# Patient Record
Sex: MALE | URBAN - METROPOLITAN AREA
[De-identification: names, ages, dates, MRNs, and addresses within clinical notes are randomized per-mention and may not be internally consistent; named-entity substitution may affect disease eponyms.]

---

## 2019-05-20 ENCOUNTER — HISTORICAL (OUTPATIENT)
Dept: ADMINISTRATIVE | Facility: HOSPITAL | Age: 69
End: 2019-05-20

## 2019-11-11 ENCOUNTER — HISTORICAL (OUTPATIENT)
Dept: ADMINISTRATIVE | Facility: HOSPITAL | Age: 69
End: 2019-11-11

## 2019-12-09 ENCOUNTER — HISTORICAL (OUTPATIENT)
Dept: ADMINISTRATIVE | Facility: HOSPITAL | Age: 69
End: 2019-12-09

## 2020-01-10 ENCOUNTER — HISTORICAL (OUTPATIENT)
Dept: RADIOLOGY | Facility: HOSPITAL | Age: 70
End: 2020-01-10

## 2020-02-13 ENCOUNTER — HISTORICAL (OUTPATIENT)
Dept: ADMINISTRATIVE | Facility: HOSPITAL | Age: 70
End: 2020-02-13

## 2020-08-13 ENCOUNTER — HISTORICAL (OUTPATIENT)
Dept: ADMINISTRATIVE | Facility: HOSPITAL | Age: 70
End: 2020-08-13

## 2022-04-12 ENCOUNTER — HISTORICAL (OUTPATIENT)
Dept: ADMINISTRATIVE | Facility: HOSPITAL | Age: 72
End: 2022-04-12

## 2022-04-30 VITALS
SYSTOLIC BLOOD PRESSURE: 156 MMHG | WEIGHT: 235.88 LBS | DIASTOLIC BLOOD PRESSURE: 88 MMHG | OXYGEN SATURATION: 96 % | HEIGHT: 67 IN | BODY MASS INDEX: 37.02 KG/M2

## 2022-05-05 NOTE — HISTORICAL [facility] C[facility]ER
This is a historical note converted from Maria Antonia. Formatting and pictures may have been removed.  Please reference Maria Antonia for original formatting and attached multimedia. Chief Complaint  right knee pain,injury happened Saturday--he was taking off his shoe and he felt a pop, (knee has been bothering him for a few months).  History of Present Illness  Patient comes in today complaint of right knee pain. ?Patient was?taking off his shoe when he twisted his knee and felt a pop. ?Since then he is been having some pain and swelling in his knee.? He states it is getting a little bit better?though has not returned to normal.  Physical Exam  Vitals & Measurements  T:?36.9? ?C (Oral)? HR:?65(Peripheral)? BP:?153/83?  HT:?170?cm? WT:?107?kg? BMI:?37.02?  Right lower extremity compartments are soft and warm. ?Skin is intact with no signs or symptoms of DVT or infection. ?He is tender along medial lateral joint line there is a small effusion?positive patella grind negative apprehension?he is tender mainly laterally,?questionable positive lateral medial Mc[place]. ?His motion is 5 to 150 degrees he stable to stressing he is neurovascular intact distally. ?X-rays?3 views right knee demonstrates tricompartmental osteoarthritis  Assessment/Plan  1.?Acute lateral meniscus tear of right knee?S83.281A  ?At this time we discussed his physical exam and x-ray findings. ?We have discussed his degenerative lateral meniscus tear versus?of his?existing arthritis. ?Under sterile technique he tolerated a steroid injection very well for the anterolateral part of the right knee. ?This was 2 cc of dexamethasone 3 cc of 2%?lidocaine without.? We have discussed low impact activities patient states he will call?if remains to be symptomatic.  Ordered:  Office/Outpatient Visit Level 3 Established [id number] PC, Acute lateral meniscus tear of right knee  Effusion, right knee  [facility], 11/11/19 11:00:00  CST  ?  2.?Effusion, right knee?M25.461  Ordered:  Office/Outpatient Visit Level 3 Established [id number] PC, Acute lateral meniscus tear of right knee  Effusion, right knee  Osteoarthritis of right knee, [facility], 11/11/19 11:00:00 CST  ?  3.?Osteoarthritis of right knee?M17.11  Ordered:  Office/Outpatient Visit Level 3 Established [id number] PC, Acute lateral meniscus tear of right knee  Effusion, right knee  Osteoarthritis of right knee, [facility], 11/11/19 11:00:00 CST  ?  Orders:  1125F Pain severity quantified, pain present, 11/11/19 11:00:00 CST  3008F Body Mass Index (BMI), documented, 11/11/19 11:00:00 CST  3077F Most recent systolic blood pressure, greater than or equal to 140 mm Hg, 11/11/19 11:00:00 CST  3079F Most recent diastolic blood pressure, 80 - 89 mm Hg, 11/11/19 11:00:00 CST  asp/inj jnt/bursa, major [id number] PC, 11/11/19 11:00:00 CST, [facility], Routine, 11/11/19 11:00:00 CST  Clinic Follow-up PRN, 11/11/19 11:00:00 CST, Future Order, [facility]  Referrals  Clinic Follow-up PRN, 11/11/19 11:00:00 CST, Future Order, [facility]   Problem List/Past Medical History  Ongoing  Obesity  Historical  High blood pressure  Medications  amLODIPine 10 mg oral tablet, 10 mg= 1 tab(s), Oral, Daily  Mobic 15 mg oral tablet, 15 mg= 1 tab(s), Oral, Daily  Mobic 15 mg oral tablet, 15 mg= 1 tab(s), Oral, Daily, 3 refills  Pantoprazole 40 mg ORAL EC-Tablet, 40 mg= 1 tab(s), Oral, Daily  sildenafil 20 mg oral tablet, 20 mg= 1 tab(s), Oral, TID  Allergies  No Known Medication Allergies  Social History  Abuse/Neglect  No, No, Yes, 11/11/2019  Alcohol  Current, 05/20/2019  Tobacco  Never (less than 100 in lifetime), N/A, 11/11/2019  Family History  Dementia: Mother.  Diabetes mellitus type 2: Father.  Health Maintenance  Health Maintenance  ???Pending?(in the next year)  ??? ??OverDue  ??? ? ? ?Advance Directive due??01/01/19??and every 1??year(s)  ??? ? ? ?Cognitive Screening  due??01/01/19??and every 1??year(s)  ??? ??Due?  ??? ? ? ?ADL Screening due??11/12/19??and every 1??year(s)  ??? ? ? ?Aspirin Therapy for CVD Prevention due??11/12/19??and every 1??year(s)  ??? ? ? ?Colorectal Screening (Senior Wellness) due??11/12/19??and every?  ??? ? ? ?Hypertension Management-BMP due??11/12/19??and every?  ??? ? ? ?Pneumococcal Vaccine due??11/12/19??Variable frequency  ??? ? ? ?Pneumococcal Vaccine due??11/12/19??and every?  ??? ? ? ?Tetanus Vaccine due??11/12/19??and every 10??year(s)  ??? ? ? ?Zoster Vaccine due??11/12/19??and every 100??year(s)  ??? ??Due In Future?  ??? ? ? ?Alcohol Misuse Screening not due until??01/01/20??and every 1??year(s)  ??? ? ? ?Fall Risk Assessment not due until??01/01/20??and every 1??year(s)  ??? ? ? ?Functional Assessment not due until??01/01/20??and every 1??year(s)  ??? ? ? ?Geriatric Depression Screening not due until??01/01/20??and every 1??year(s)  ??? ? ? ?Obesity Screening not due until??01/01/20??and every 1??year(s)  ??? ? ? ?Hypertension Management-Blood Pressure not due until??11/10/20??and every 1??year(s)  ???Satisfied?(in the past 1 year)  ??? ??Satisfied?  ??? ? ? ?Alcohol Misuse Screening on??09/23/19.??Satisfied by Esperanza Weems LPN  ??? ? ? ?Blood Pressure Screening on??11/11/19.??Satisfied by Esperanza Weems LPN  ??? ? ? ?Body Mass Index Check on??11/11/19.??Satisfied by Esperanza Weems LPN  ??? ? ? ?Depression Screening on??06/24/19.??Satisfied by Salena Presley  ??? ? ? ?Fall Risk Assessment on??05/20/19.??Satisfied by Ila Alexander  ??? ? ? ?Functional Assessment on??05/20/19.??Satisfied by Ila Alexander  ??? ? ? ?Geriatric Depression Screening on??06/24/19.??Satisfied by Salena Presley  ??? ? ? ?Hypertension Management-Blood Pressure on??11/11/19.??Satisfied by Esperanza Weems LPN  ??? ? ? ?Obesity Screening on??11/11/19.??Satisfied by Esperanza Weems LPN  ?

## 2022-05-05 NOTE — HISTORICAL [facility]ER
This is a historical note converted from Maria Antonia. Formatting and pictures may have been removed.  Please reference Maria Antonia for original formatting and attached multimedia. Chief Complaint  lower back pain, radiates from lowerback down right leg with slight numbness started around 7.13.2020.  History of Present Illness  Patient comes in today complaining of lower back pain, shooting pain down his right leg. ?He states it is going on for the last month. ?He was initially seen by his PCP?normally goes away by now, though still present. ?He denies any bowel or bladder dysfunction he denies any specific trauma he denies other complaints.  Physical Exam  Vitals & Measurements  T:?99.7? ?F (Oral)? HR:?76(Peripheral)? BP:?156/86?  HT:?170.00?cm? WT:?37.020?kg? BMI:?12.81?  Semination lumbar spine he is tender patient with the paraspinal muscles more on the right side. ?He is nontender over the spinous processes. ?He is able to forward flex?50 degrees.? He has 5-5 strength from L2-S1 states intact light touch negative clonus bilaterally.? X-rays 3 views lumbar spine demonstrates L4-5 L5?1?degeneration  Assessment/Plan  1.?Lumbar radiculopathy?M54.16  ?At this time we discussed his physical exam and x-ray findings. ?Patient has lumbar radiculopathy,?we will proceed with an MRI of his lumbar spine. ?We have discussed?following up with Dr. Abdi, who has been addressing his cervical spine, he would like to proceed with this,?in the meantime I will call him with his MRI results. ?We have discussed a Medrol Dosepak with appropriate precautions,?lumbar stabilization and strengthening exercises.  Ordered:  MRI Lumbar Spine W/O Contrast, Routine, 08/13/20 10:51:00 CDT, Other (please specify), Back pain or radiculopathy, > 6 wks, None, Ambulatory, Rad Type, Order for future visit, Lumbar radiculopathy  Lumbar spondylosis, Schedule this test, [facility], 08/...  Office/Outpatient Visit Level 4  Established [id number] PC, Lumbar radiculopathy  Lumbar spondylosis, [facility], 08/13/20 10:50:00 CDT  ?  2.?Lumbar spondylosis?M47.816  Ordered:  MRI Lumbar Spine W/O Contrast, Routine, 08/13/20 10:51:00 CDT, Other (please specify), Back pain or radiculopathy, > 6 wks, None, Ambulatory, Rad Type, Order for future visit, Lumbar radiculopathy  Lumbar spondylosis, Schedule this test, [facility], 08/...  Office/Outpatient Visit Level 4 Established [id number] PC, Lumbar radiculopathy  Lumbar spondylosis, [facility], 08/13/20 10:50:00 CDT  ?  Orders:  methylPREDNISolone, = 1 packet(s), Oral, As Directed, as directed on package labeling, # 21 tab(s), 0 Refill(s), Pharmacy: [facility], 170, cm, Height/Length Dosing, 08/13/20 10:38:00 CDT, 37.02, kg, Weight Dosing, 08/13/20 10:38:00 CDT  1111F- Medication reconciliation completed within 30 days of an inpatient discharge to home, 08/13/20 10:50:00 CDT  1125F Pain severity quantified, pain present, 08/13/20 10:50:00 CDT  3008F Body Mass Index (BMI), documented, 08/13/20 10:50:00 CDT  3077F Most recent systolic blood pressure, greater than or equal to 140 mm Hg, 08/13/20 10:50:00 CDT  3079F Most recent diastolic blood pressure, 80 - 89 mm Hg, 08/13/20 10:50:00 CDT  Referrals  External Referral, Pulmonary clinic - lung mass, eval and treat ct attached, 12/16/19 9:19:00 CST, Lung mass   Problem List/Past Medical History  Ongoing  Cervical radiculopathy  Cervical spinal stenosis  Cervicalgia  GERD - Gastro-esophageal reflux disease  Hypertension  Obesity  Obesity  Historical  High blood pressure  Procedure/Surgical History  18740-QXR DX/THER SBST INTRLMNR CRV/THRC W/IMG GDN (None) (02/13/2020)  Fluoroscopy of Spinal Cord using Low Osmolar Contrast (02/13/2020)  Injection(s), of diagnostic or therapeutic substance(s) (eg, anesthetic, antispasmodic, opioid, steroid, other solution), not including neurolytic substances, including  needle or catheter placement, interlaminar epidural or subarachnoid, cervical or thora (02/13/2020)  Introduction of Anti-inflammatory into Spinal Canal, Percutaneous Approach (02/13/2020)  None   Medications  acetaminophen-hydrocodone 325 mg-7.5 mg oral tablet, 1 tab(s), Oral, q4-6hr  AMLODIPINE TAB 10MG, 10 mg= 1 tab(s), Oral, Daily  Fluarix PF Quadrivalent [id number] intramuscular suspension, 0.5 mL, IM, Once  Medrol 4 mg oral tablet, 1 packet(s), Oral, As Directed  OMEPRAZOLE CAP 40MG, 40 mg= 1 cap(s), Oral, Daily  Pantoprazole 40 mg ORAL EC-Tablet, 40 mg= 1 tab(s), Oral, Daily,? ?Not taking: Last Dose Date/Time Unknown  sildenafil 20 mg oral tablet, 20 mg= 1 tab(s), Oral, TID  SMZ/TMP DS -160, 1 tab(s), Oral, BID,? ?Not Taking, Completed Rx: Last Dose Date/Time Unknown  Allergies  No Known Allergies  No Known Medication Allergies  Social History  Abuse/Neglect  No, No, Yes, 08/13/2020  Alcohol  Current, 05/20/2019  3-5 times per week, 11/17/2017  Employment/School  Retired, 01/29/2020  Exercise  Exercise frequency: 3-4 times/week. Exercise type: gym., 01/29/2020  Home/Environment  Lives with Spouse., 01/29/2020  Nutrition/Health  Regular, 01/29/2020  Substance Use  Never, 11/17/2017  Tobacco  Never (less than 100 in lifetime), N/A, 08/13/2020  Family History  Dementia: Mother.  Diabetes mellitus type 2: Father.  Health Maintenance  Health Maintenance  ???Pending?(in the next year)  ??? ??OverDue  ??? ? ? ?Advance Directive due??01/02/20??and every 1??year(s)  ??? ? ? ?Alcohol Misuse Screening due??01/02/20??and every 1??year(s)  ??? ? ? ?Cognitive Screening due??01/02/20??and every 1??year(s)  ??? ??Due?  ??? ? ? ?Depression Screening due??06/23/20??and every 1??year(s)  ??? ? ? ?ADL Screening due??08/14/20??and every 1??year(s)  ??? ? ? ?Aspirin Therapy for CVD Prevention due??08/14/20??and every 1??year(s)  ??? ? ? ?Colorectal Screening due??08/14/20??and every?  ??? ? ? ?Diabetes Screening  due??08/14/20??and every?  ??? ? ? ?Hypertension Management-BMP due??08/14/20??and every?  ??? ? ? ?Hypertension Management-Education due??08/14/20??and every 1??year(s)  ??? ? ? ?Influenza Vaccine due??08/14/20??and every?  ??? ? ? ?Lipid Screening due??08/14/20??and every?  ??? ? ? ?Medicare Annual Wellness Exam due??08/14/20??and every 1??year(s)  ??? ? ? ?Pneumococcal Vaccine due??08/14/20??and every?  ??? ? ? ?Tetanus Vaccine due??08/14/20??and every 10??year(s)  ??? ? ? ?Zoster Vaccine due??08/14/20??and every?  ??? ??Due In Future?  ??? ? ? ?Obesity Screening not due until??01/01/21??and every 1??year(s)  ??? ? ? ?Fall Risk Assessment not due until??01/02/21??and every 1??year(s)  ??? ? ? ?Functional Assessment not due until??01/02/21??and every 1??year(s)  ??? ? ? ?Blood Pressure Screening not due until??02/25/21??and every 1??year(s)  ??? ? ? ?Body Mass Index Check not due until??02/25/21??and every 1??year(s)  ??? ? ? ?Hypertension Management-Blood Pressure not due until??02/25/21??and every 1??year(s)  ???Satisfied?(in the past 1 year)  ??? ??Satisfied?  ??? ? ? ?Alcohol Misuse Screening on??12/16/19.??Satisfied by Areli Salazar LPN  ??? ? ? ?Blood Pressure Screening on??08/13/20.??Satisfied by Esperanza Weems LPN  ??? ? ? ?Body Mass Index Check on??08/13/20.??Satisfied by Esperanza Weems LPN  ??? ? ? ?Fall Risk Assessment on??08/13/20.??Satisfied by Esperanza Weems LPN  ??? ? ? ?Functional Assessment on??02/13/20.??Satisfied by Iona Sims RN  ??? ? ? ?Hypertension Management-Blood Pressure on??08/13/20.??Satisfied by Esperanza Weems LPN  ??? ? ? ?Influenza Vaccine on??02/13/20.??Satisfied by Iona Sims RN  ??? ? ? ?Obesity Screening on??08/13/20.??Satisfied by Esperanza Weems LPN  ?

## 2022-05-05 NOTE — HISTORICAL [facility] CERNER
This is a historical note converted from Maria Antonia. Formatting and pictures may have been removed.  Please reference Maria Antonia for original formatting and attached multimedia. Chief Complaint  Left ankle pain. No injury, no prior sx.  History of Present Illness  Patient comes in today for his left foot.? Patient denies any specific or new injury. ?Patient is noticed some pain on the outside part of his left foot and ankle?which is not improved over the last couple of months. ?He is tried rest medication and change in shoewear without relief. ?He also had x-rays and a MRI, we have discussed his results today. ?He denies any numbness or tingling he denies other complaints. ?He has worsening pain throughout the day, with long standing.  Physical Exam  Vitals & Measurements  T:?37.1? ?C (Oral)? HR:?63(Peripheral)? BP:?139/81?  HT:?170?cm? WT:?109?kg? BMI:?37.72?  Patient is well-nourished well-developed male he is awake alert and oriented x3 he is in apparent distress he is pleasant and cooperative. ?Examination left lower extremity form soft and warm. ?Skin is intact. ?There are no signs or symptoms of DVT or infection.? He does have a fallen medial arch, he does roll in his midfoot.? He also has some tenderness along the peroneal tendons, he has impingement?near his lateral malleolus when his midfoot is rolling in. ?He has 40 degrees of ankle motion he stable to stressing is no pain with subtalar motion?he is neurovascular intact distally.? X-rays 3 views left ankle demonstrate no obvious fracture dislocation.  Assessment/Plan  1.?Peroneal tendonitis of left lower leg?M76.72  ? At this time we discussed his physical exam and x-ray findings. ?We have also discussed his MRI as well. ?He is mainly symptomatic about his?collapsed arch and lateral impingement. ?We will start with?arch support,?shoe insert. ?We have discussed some anti-inflammatories,?we have also discussed some foot and ankle range of motion exercises. ?I would  like to see him back in 4 weeks to see how he is progressing.  Ordered:  meloxicam, 15 mg = 1 tab(s), Oral, Daily, # 30 tab(s), 0 Refill(s), [facility]: [facility]  1126F Pain severity quantified, no pain present, 05/20/19 11:26:00 CDT  3008F Body Mass Index (BMI), documented, 05/20/19 11:26:00 CDT  3075F Most recent systolic blood pressure, 130 to 139 mm Hg, 05/20/19 11:26:00 CDT  3079F Most recent diastolic blood pressure, 80 - 89 mm Hg, 05/20/19 11:26:00 CDT  Clinic Follow up, *Est. 06/17/19 3:00:00 CDT, Order for future visit, Peroneal tendonitis of left lower leg  Ankle impingement syndrome  Collapsed arches, [facility], 05/20/19 11:26:00 CDT, medial arch support- left shoe insert. Dx. left lateral tendon impingement with medial arch collapse, Peroneal tendonitis of left lower leg  Ankle impingement syndrome  Collapsed arches  Office/Outpatient Visit Level 4 New [id number] PC, Peroneal tendonitis of left lower leg  Ankle impingement syndrome  Collapsed arches, [facility], 05/20/19 11:26:00 CDT  ?  2.?Ankle impingement syndrome?M25.879  Ordered:  meloxicam, 15 mg = 1 tab(s), Oral, Daily, # 30 tab(s), 0 Refill(s), [facility]: [facility]  1126F Pain severity quantified, no pain present, 05/20/19 11:26:00 CDT  3008F Body Mass Index (BMI), documented, 05/20/19 11:26:00 CDT  3075F Most recent systolic blood pressure, 130 to 139 mm Hg, 05/20/19 11:26:00 CDT  3079F Most recent diastolic blood pressure, 80 - 89 mm Hg, 05/20/19 11:26:00 CDT  Clinic Follow up, *Est. 06/17/19 3:00:00 CDT, Order for future visit, Peroneal tendonitis of left lower leg  Ankle impingement syndrome  Collapsed arches, [facility], 05/20/19 11:26:00 CDT, medial arch support- left shoe insert. Dx. left lateral tendon impingement with medial arch collapse, Peroneal tendonitis of left lower leg  Ankle impingement syndrome  Collapsed  arch  Office/Outpatient Visit Level 4 New [id number] PC, Peroneal tendonitis of left lower leg  Ankle impingement syndrome  Collapsed arches, [facility], 05/20/19 11:26:00 CDT  ?  3.?Collapsed arches?M21.40  Ordered:  meloxicam, 15 mg = 1 tab(s), Oral, Daily, # 30 tab(s), 0 Refill(s), Pharmacy: [place]  1126F Pain severity quantified, no pain present, 05/20/19 11:26:00 CDT  3008F Body Mass Index (BMI), documented, 05/20/19 11:26:00 CDT  3075F Most recent systolic blood pressure, 130 to 139 mm Hg, 05/20/19 11:26:00 CDT  3079F Most recent diastolic blood pressure, 80 - 89 mm Hg, 05/20/19 11:26:00 CDT  Clinic Follow up, *Est. 06/17/19 3:00:00 CDT, Order for future visit, Peroneal tendonitis of left lower leg  Ankle impingement syndrome  Collapsed [facility], [facility], 05/20/19 11:26:00 CDT, medial arch support- left shoe insert. Dx. left lateral tendon impingement with medial arch collapse, Peroneal tendonitis of left lower leg  Ankle impingement syndrome  Collapsed arches  Office/Outpatient Visit Level 4 New [id number] PC, Peroneal tendonitis of left lower leg  Ankle impingement syndrome  Collapsed [facility], [facility], 05/20/19 11:26:00 CDT  ?  Referrals  Clinic Follow up, *Est. 06/17/19 3:00:00 CDT, Order for future visit, Peroneal tendonitis of left lower leg  Ankle impingement syndrome  Collapsed arches, [facility]   Problem List/Past Medical History  Ongoing  Obesity  Historical  High blood pressure  Medications  amLODIPine 10 mg oral tablet, 10 mg= 1 tab(s), Oral, Daily  Mobic 15 mg oral tablet, 15 mg= 1 tab(s), Oral, Daily  Pantoprazole 40 mg ORAL EC-Tablet, 40 mg= 1 tab(s), Oral, Daily  Allergies  No Known Medication Allergies  Social History  Alcohol  Current, 05/20/2019  Tobacco  Never (less than 100 in lifetime), No, 05/20/2019  Family History  Dementia: Mother.  Diabetes mellitus type 2: Father.  Health Maintenance  Health  Maintenance  ???Pending?(in the next year)  ??? ??OverDue  ??? ? ? ?Advance Directive due??01/01/19??and every 1??year(s)  ??? ? ? ?Alcohol Misuse Screening due??01/01/19??and every 1??year(s)  ??? ? ? ?Cognitive Screening due??01/01/19??and every 1??year(s)  ??? ? ? ?Geriatric Depression Screening due??01/01/19??and every 1??year(s)  ??? ??Due?  ??? ? ? ?ADL Screening due??05/21/19??and every 1??year(s)  ??? ? ? ?Aspirin Therapy for CVD Prevention due??05/21/19??and every 1??year(s)  ??? ? ? ?Colorectal Screening (Senior Wellness) due??05/21/19??and every?  ??? ? ? ?Hypertension Management-BMP due??05/21/19??and every?  ??? ? ? ?Pneumococcal Vaccine due??05/21/19??Variable frequency  ??? ? ? ?Pneumococcal Vaccine due??05/21/19??and every?  ??? ? ? ?Tetanus Vaccine due??05/21/19??and every 10??year(s)  ??? ? ? ?Zoster Vaccine due??05/21/19??and every 100??year(s)  ??? ??Due In Future?  ??? ? ? ?Fall Risk Assessment not due until??01/01/20??and every 1??year(s)  ??? ? ? ?Functional Assessment not due until??01/01/20??and every 1??year(s)  ??? ? ? ?Obesity Screening not due until??01/01/20??and every 1??year(s)  ??? ? ? ?Hypertension Management-Blood Pressure not due until??05/19/20??and every 1??year(s)  ???Satisfied?(in the past 1 year)  ??? ??Satisfied?  ??? ? ? ?Blood Pressure Screening on??05/20/19.??Satisfied by Ila Alexander  ??? ? ? ?Body Mass Index Check on??05/20/19.??Satisfied by Ila Alexander  ??? ? ? ?Fall Risk Assessment on??05/20/19.??Satisfied by Ila Alexander  ??? ? ? ?Functional Assessment on??05/20/19.??Satisfied by Ila Alexander  ??? ? ? ?Hypertension Management-Blood Pressure on??05/20/19.??Satisfied by Ila Alexander  ??? ? ? ?Obesity Screening on??05/20/19.??Satisfied by Ila Alexander  ?  ?
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
